# Patient Record
Sex: FEMALE | Race: BLACK OR AFRICAN AMERICAN
[De-identification: names, ages, dates, MRNs, and addresses within clinical notes are randomized per-mention and may not be internally consistent; named-entity substitution may affect disease eponyms.]

---

## 2018-11-27 ENCOUNTER — HOSPITAL ENCOUNTER (EMERGENCY)
Dept: HOSPITAL 74 - JER | Age: 23
LOS: 1 days | Discharge: HOME | End: 2018-11-28
Payer: COMMERCIAL

## 2018-11-27 VITALS — BODY MASS INDEX: 21.2 KG/M2

## 2018-11-27 VITALS — TEMPERATURE: 97.7 F

## 2018-11-27 DIAGNOSIS — F41.9: ICD-10-CM

## 2018-11-27 DIAGNOSIS — R51: ICD-10-CM

## 2018-11-27 DIAGNOSIS — R19.7: ICD-10-CM

## 2018-11-27 DIAGNOSIS — R11.2: ICD-10-CM

## 2018-11-27 DIAGNOSIS — R55: Primary | ICD-10-CM

## 2018-11-27 LAB
ALBUMIN SERPL-MCNC: 4.3 G/DL (ref 3.4–5)
ALP SERPL-CCNC: 92 U/L (ref 45–117)
ALT SERPL-CCNC: 21 U/L (ref 13–61)
ANION GAP SERPL CALC-SCNC: 7 MMOL/L (ref 8–16)
APPEARANCE UR: (no result)
AST SERPL-CCNC: 20 U/L (ref 15–37)
BASOPHILS # BLD: 0.5 % (ref 0–2)
BILIRUB SERPL-MCNC: 0.4 MG/DL (ref 0.2–1)
BILIRUB UR STRIP.AUTO-MCNC: NEGATIVE MG/DL
BUN SERPL-MCNC: 11 MG/DL (ref 7–18)
CALCIUM SERPL-MCNC: 9.1 MG/DL (ref 8.5–10.1)
CHLORIDE SERPL-SCNC: 104 MMOL/L (ref 98–107)
CO2 SERPL-SCNC: 28 MMOL/L (ref 21–32)
COLOR UR: YELLOW
CREAT SERPL-MCNC: 0.8 MG/DL (ref 0.55–1.3)
DEPRECATED RDW RBC AUTO: 12.8 % (ref 11.6–15.6)
EOSINOPHIL # BLD: 0.8 % (ref 0–4.5)
EPITH CASTS URNS QL MICRO: (no result) /HPF
GLUCOSE SERPL-MCNC: 93 MG/DL (ref 74–106)
HCT VFR BLD CALC: 37.9 % (ref 32.4–45.2)
HGB BLD-MCNC: 13.1 GM/DL (ref 10.7–15.3)
KETONES UR QL STRIP: (no result)
LEUKOCYTE ESTERASE UR QL STRIP.AUTO: NEGATIVE
LYMPHOCYTES # BLD: 24.5 % (ref 8–40)
MCH RBC QN AUTO: 29.2 PG (ref 25.7–33.7)
MCHC RBC AUTO-ENTMCNC: 34.6 G/DL (ref 32–36)
MCV RBC: 84.3 FL (ref 80–96)
MONOCYTES # BLD AUTO: 6.2 % (ref 3.8–10.2)
NEUTROPHILS # BLD: 68 % (ref 42.8–82.8)
NITRITE UR QL STRIP: NEGATIVE
PH UR: 8 [PH] (ref 5–8)
PLATELET # BLD AUTO: 290 K/MM3 (ref 134–434)
PMV BLD: 8.1 FL (ref 7.5–11.1)
POTASSIUM SERPLBLD-SCNC: 4.1 MMOL/L (ref 3.5–5.1)
PROT SERPL-MCNC: 8.8 G/DL (ref 6.4–8.2)
PROT UR QL STRIP: (no result)
PROT UR QL STRIP: NEGATIVE
RBC # BLD AUTO: 4.49 M/MM3 (ref 3.6–5.2)
SODIUM SERPL-SCNC: 138 MMOL/L (ref 136–145)
SP GR UR: 1.02 (ref 1.01–1.03)
UROBILINOGEN UR STRIP-MCNC: NEGATIVE MG/DL (ref 0.2–1)
WBC # BLD AUTO: 5.2 K/MM3 (ref 4–10)
YEAST #/AREA URNS HPF: (no result) /[HPF]

## 2018-11-27 PROCEDURE — 3E033GC INTRODUCTION OF OTHER THERAPEUTIC SUBSTANCE INTO PERIPHERAL VEIN, PERCUTANEOUS APPROACH: ICD-10-PCS

## 2018-11-27 NOTE — PDOC
Rapid Medical Evaluation


Chief Complaint: Syncope/Near Syncope


Time Seen by Provider: 11/27/18 21:10


Medical Evaluation: 


 Allergies











Allergy/AdvReac Type Severity Reaction Status Date / Time


 


Penicillins Allergy   Verified 01/05/16 09:29











11/27/18 21:12


Patient c/o: hx anxiety, started to feel anxious while visiting mother at [facility], then syncoped while in mother's room, vomited x 3, pt crying in triage


Patient on brief exam: vss


Patient ordered for: labs, urine, ekg


The patient will proceed to the ED





**Discharge Disposition





- Diagnosis


 Syncope








- Referrals


Referrals: 


Bárbara Lynch [Primary Care Provider] - 





- Patient Instructions





- Post Discharge Activity

## 2018-11-27 NOTE — PDOC
History of Present Illness





- General


History Source: Patient


Exam Limitations: No Limitations





<José Miguel Haynes - Last Filed: 11/27/18 23:00>





<Aggie Oliveira - Last Filed: 11/28/18 00:14>





- General


Chief Complaint: Syncope/Near Syncope


Stated Complaint: Syncope/Near Syncope


Time Seen by Provider: 11/27/18 21:10





- History of Present Illness


Initial Comments: 





11/27/18 23:00


The patient is a 23 year old female, with a significant past medical history of 

anxiety, who presents to the emergency department s/p syncope with, abdominal 

pain, nausea, vomiting (x4), and diarrhea (loose stool). As per patient, when 

she woke up this morning feeling weak experiencing diffuse abdominal pain, 

nausea, vomiting, and diarrhea. Patient notes after leaving work she began to 

feel dizzy while driving to visit her mother in the hospital. Patient endorses 

that when visiting her mother she began to feel dizzy again, syncopized, 

prompting her visit to the ER. She notes an associated headache since her fall. 

Patient is currently on her menses and notes similar episodes in the past due 

to dehydration. Patient endorses increased stress and anxiety attacks over the 

past few days.





She is unaware if she hit her head. She denies recent fevers or chills. She 

denies recent  dysuria, frequency, urgency or hematuria. She denies recent 

chest pain or shortness of breath.





Allergies: Penicillins. 


Past surgical history: None reported.


Social history: Nonsmoker. Denies EtOH use and recreational drug use. 


Primary Care Physician: Dr. Lynch


 (José Miguel Haynes)





Past History





<José Miguel Haynes - Last Filed: 11/27/18 23:00>





- Past Medical History


Asthma: Yes


COPD: No





- Immunization History


Immunization Up to Date: Yes





- Suicide/Smoking/Psychosocial Hx


Smoking History: Never smoked


Have you smoked in the past 12 months: No


Number of Cigarettes Smoked Daily: 1


Information on smoking cessation initiated: No


Hx Alcohol Use: No


Drug/Substance Use Hx: No


Substance Use Type: Marijuana





<Aggie Oliveira - Last Filed: 11/28/18 00:14>





- Past Medical History


Allergies/Adverse Reactions: 


 Allergies











Allergy/AdvReac Type Severity Reaction Status Date / Time


 


Penicillins Allergy   Verified 11/27/18 21:15











Home Medications: 


Ambulatory Orders





traZODone HCL [Trazodone HCl] 50 mg PO DAILY 11/27/18 











**Review of Systems





- Review of Systems


Able to Perform ROS?: Yes


All Other Systems: Reviewed and Negative





<José Miguel Haynes - Last Filed: 11/27/18 23:00>





<Aggie Oliveira - Last Filed: 11/28/18 00:14>





- Review of Systems


Comments:: 





11/27/18 23:01


CONSTITUTIONAL:


Absent: fever, no chills, no fatigue


EYES:


Absent: visual changes


ENT:


Absent: ear pain, no sore throat


CARDIOVASCULAR:


Absent: chest pain, no palpitations


RESPIRATORY:


Absent: cough, no SOB


GI:


Present: Nausea. Vomiting. Diarrhea. Abdominal pain. 


GENITOURINARY:


Absent: dysuria, no frequency, no hematuria


MUSKULOSKELETAL:


Absent: back pain, no arthralgia, no myalgia


SKIN:


Absent: rash


NEURO:


Present: Headache. Syncope. Dizziness.


 (José Miguel Haynes)





*Physical Exam





<José Miguel Haynes - Last Filed: 11/27/18 23:00>





<Aggie Oliveira - Last Filed: 11/28/18 00:14>





- Vital Signs


 Last Vital Signs











Temp Pulse Resp BP Pulse Ox


 


 97.7 F   105 H  16   138/85   98 


 


 11/27/18 21:12  11/27/18 21:12  11/27/18 21:12  11/27/18 21:12  11/27/18 21:12














- Physical Exam


Comments: 





11/27/18 23:01


GENERAL:


Well developed, well nourished. Awake and alert. No acute distress.


HEENT:


Normocephalic, atraumatic. PERRLA, EOMI. No conjunctival pallor. Sclera are non-

icteric. Moist mucous membranes. Oropharynx is clear.


NECK: 


Supple. Full ROM. No JVD. Carotid pulses 2+ and symmetric, without bruits. No 

thyromegaly. No lymphadenopathy.


CARDIOVASCULAR:


Regular rate and rhythm. No murmurs, rubs, or gallops. Distal pulses are 2+ and 

symmetric. 


PULMONARY: 


No evidence of respiratory distress. Lungs clear to auscultation bilaterally. 

No wheezing, rales or rhonchi.


ABDOMINAL:


Soft. Non-tender. Non-distended. No rebound or guarding. No organomegaly. 

Normoactive bowel sounds. 


MUSCULOSKELETAL 


Normal range of motion at all joints. No bony deformities or tenderness. No CVA 

tenderness.


EXTREMITIES: 


No cyanosis. No clubbing. No edema. No calf tenderness.


SKIN: 


Warm and dry. Normal capillary refill. No rashes. No jaundice. 


NEUROLOGICAL: 


Alert, awake, appropriate. Cranial nerves 2-12 intact. Normal speech. No focal 

neurological deficits. 


PSYCHIATRIC: 


Cooperative. Good eye contact. Appropriate mood and affect.


 (José Miguel Haynes)





- Procedure Monitoring


Vital Signs: 


Procedure Monitoring Vital Signs











Temperature  97.7 F   11/27/18 21:12


 


Pulse Rate  105 H  11/27/18 21:12


 


Respiratory Rate  16   11/27/18 21:12


 


Blood Pressure  138/85   11/27/18 21:12


 


O2 Sat by Pulse Oximetry (%)  98   11/27/18 21:12











ED Treatment Course





- LABORATORY


CBC & Chemistry Diagram: 


 11/27/18 22:20





 11/27/18 22:20





<José Miguel Haynes - Last Filed: 11/27/18 23:00>





- LABORATORY


CBC & Chemistry Diagram: 


 11/27/18 22:20





 11/27/18 22:20





<Aggie Oliveira - Last Filed: 11/28/18 00:14>





- ADDITIONAL ORDERS


Additional order review: 


 Laboratory  Results











  11/27/18 11/27/18 11/27/18





  22:50 22:50 22:20


 


Sodium    138


 


Potassium    4.1


 


Chloride    104


 


Carbon Dioxide    28


 


Anion Gap    7 L


 


BUN    11


 


Creatinine    0.8


 


Creat Clearance w eGFR    > 60


 


Random Glucose    93


 


Calcium    9.1


 


Total Bilirubin    0.4


 


AST    20


 


ALT    21


 


Alkaline Phosphatase    92


 


Creatine Kinase    153


 


Creatine Kinase Index    0.6


 


CK-MB (CK-2)    < 1.0


 


Troponin I    < 0.02


 


Total Protein    8.8 H


 


Albumin    4.3


 


Urine Color   Yellow 


 


Urine Appearance   Cloudy 


 


Urine pH   8.0  D 


 


Ur Specific Gravity   1.023 


 


Urine Protein   2+ H 


 


Urine Glucose (UA)   Negative 


 


Urine Ketones   1+ H 


 


Urine Blood   2+ H 


 


Urine Nitrite   Negative 


 


Urine Bilirubin   Negative 


 


Urine Urobilinogen   Negative 


 


Ur Leukocyte Esterase   Negative 


 


Urine WBC (Auto)   1 


 


Urine RBC (Auto)   4 


 


Ur Epithelial Cells   Rare 


 


Urine Yeast   Rare 


 


Urine HCG, Qual  Negative  








 











  11/27/18





  22:20


 


RBC  4.49


 


MCV  84.3


 


MCHC  34.6


 


RDW  12.8


 


MPV  8.1


 


Neutrophils %  68.0  D


 


Lymphocytes %  24.5  D


 


Monocytes %  6.2


 


Eosinophils %  0.8


 


Basophils %  0.5  D














- Medications


Given in the ED: 


ED Medications














Discontinued Medications














Generic Name Dose Route Start Last Admin





  Trade Name Sonia  PRN Reason Stop Dose Admin


 


Acetaminophen  975 mg  11/27/18 23:45  11/28/18 00:05





  Tylenol -  PO  11/27/18 23:46  975 mg





  ONCE STA   Administration





     





     





     





     


 


Sodium Chloride  1,000 mls @ 1,000 mls/hr  11/27/18 21:59  11/27/18 22:50





  Normal Saline -  IV  11/27/18 22:58  1,000 mls/hr





  ASDIR STA   Administration





     





     





     





     


 


Ondansetron HCl  4 mg  11/27/18 21:59  11/27/18 22:50





  Zofran Injection  IVPUSH  11/27/18 22:00  4 mg





  ONCE ONE   Administration





     





     





     





     














*DC/Admit/Observation/Transfer





<José Miguel Haynes - Last Filed: 11/27/18 23:00>





<Aggie Oliveira - Last Filed: 11/28/18 00:14>


Diagnosis at time of Disposition: 


 Nausea vomiting and diarrhea, Vasovagal episode








- Discharge Dispostion


Disposition: HOME


Condition at time of disposition: Stable





- Referrals


Referrals: 


Bárbara Lynch [Primary Care Provider] - 





- Patient Instructions


Printed Discharge Instructions:  DI for Nausea -- Adult, DI for Vomiting -- 

Adult, DI for Anxiety -- Adult, DI for Syncope in Adults (Fainting)


Additional Instructions: 


please advance your diet as tolerated


Start  with liquids and then try solid foods such as bananas,rice ,toast,

applesauce


return for worsening symptoms





- Post Discharge Activity





- Attestations


Scribe Attestion: 





11/27/18 23:01





Documentation prepared by José Miguel Haynes, acting as medical scribe for 

Aggie Oliveira MD. (José Miguel Haynes)

## 2018-11-28 VITALS — SYSTOLIC BLOOD PRESSURE: 132 MMHG | DIASTOLIC BLOOD PRESSURE: 78 MMHG | HEART RATE: 99 BPM

## 2018-11-28 NOTE — EKG
Test Reason : 

Blood Pressure : ***/*** mmHG

Vent. Rate : 085 BPM     Atrial Rate : 085 BPM

   P-R Int : 154 ms          QRS Dur : 092 ms

    QT Int : 356 ms       P-R-T Axes : 050 073 051 degrees

   QTc Int : 423 ms

 

NORMAL SINUS RHYTHM WITH SINUS ARRHYTHMIA

NORMAL ECG

NO PREVIOUS ECGS AVAILABLE

Confirmed by JOVANNA SINGH, MARTY ([id number]) on 11/28/2018 11:45:01 AM

 

Referred By:  IDA           Confirmed By:MARTY NUGENT MD

## 2018-12-08 ENCOUNTER — HOSPITAL ENCOUNTER (EMERGENCY)
Dept: HOSPITAL 74 - JERFT | Age: 23
Discharge: HOME | End: 2018-12-08
Payer: COMMERCIAL

## 2018-12-08 VITALS — TEMPERATURE: 98.9 F

## 2018-12-08 VITALS — DIASTOLIC BLOOD PRESSURE: 86 MMHG | HEART RATE: 101 BPM | SYSTOLIC BLOOD PRESSURE: 131 MMHG

## 2018-12-08 VITALS — BODY MASS INDEX: 24.3 KG/M2
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
## 2018-12-08 DIAGNOSIS — S09.93XA: Primary | ICD-10-CM
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
## 2018-12-09 NOTE — PDOC
Patient Follow-up (Call Back)





- Post ED Follow - Up


Condition at time of discharge: Stable


Disposition at time of original discharge: HOME


Reason for Call Back: Complaint/Condition F/U





- Disposition


Additional Instructions/Notes: 





Pt called back, as she was having pain and jaw stiffness. She was given valium 

in ED, which did help. I will prescribe high dose motrin and robaxin for her to 

take PRN.

## 2021-12-04 ENCOUNTER — HOSPITAL ENCOUNTER (EMERGENCY)
Dept: HOSPITAL 74 - JER | Age: 26
Discharge: HOME | End: 2021-12-04
Payer: COMMERCIAL

## 2021-12-04 VITALS — SYSTOLIC BLOOD PRESSURE: 110 MMHG | TEMPERATURE: 98.6 F | HEART RATE: 97 BPM | DIASTOLIC BLOOD PRESSURE: 68 MMHG

## 2021-12-04 VITALS — BODY MASS INDEX: 25 KG/M2

## 2021-12-04 DIAGNOSIS — R07.9: ICD-10-CM

## 2021-12-04 DIAGNOSIS — R55: ICD-10-CM

## 2021-12-04 DIAGNOSIS — S09.90XA: Primary | ICD-10-CM
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
## 2021-12-04 LAB
ALBUMIN SERPL-MCNC: 3.7 G/DL (ref 3.4–5)
ALP SERPL-CCNC: 94 U/L (ref 45–117)
ALT SERPL-CCNC: 17 U/L (ref 13–61)
AMPHET UR-MCNC: NEGATIVE NG/ML
ANION GAP SERPL CALC-SCNC: 6 MMOL/L (ref 8–16)
APPEARANCE UR: CLEAR
APTT BLD: 29.4 SECONDS (ref 25.2–36.5)
AST SERPL-CCNC: 17 U/L (ref 15–37)
BARBITURATES UR-MCNC: NEGATIVE UG/ML
BASOPHILS # BLD: 0.2 % (ref 0–2)
BENZODIAZ UR SCN-MCNC: NEGATIVE NG/ML
BILIRUB SERPL-MCNC: 0.4 MG/DL (ref 0.2–1)
BILIRUB UR STRIP.AUTO-MCNC: NEGATIVE MG/DL
BUN SERPL-MCNC: 4 MG/DL (ref 7–18)
CALCIUM SERPL-MCNC: 9 MG/DL (ref 8.5–10.1)
CHLORIDE SERPL-SCNC: 104 MMOL/L (ref 98–107)
CO2 SERPL-SCNC: 26 MMOL/L (ref 21–32)
COCAINE UR-MCNC: NEGATIVE NG/ML
COLOR UR: YELLOW
CREAT SERPL-MCNC: 0.8 MG/DL (ref 0.55–1.3)
DEPRECATED RDW RBC AUTO: 15 % (ref 11.6–15.6)
EOSINOPHIL # BLD: 0.1 % (ref 0–4.5)
GLUCOSE SERPL-MCNC: 104 MG/DL (ref 74–106)
HCT VFR BLD CALC: 37.3 % (ref 32.4–45.2)
HGB BLD-MCNC: 12.2 GM/DL (ref 10.7–15.3)
INR BLD: 1.17 (ref 0.83–1.09)
KETONES UR QL STRIP: (no result)
LEUKOCYTE ESTERASE UR QL STRIP.AUTO: NEGATIVE
LYMPHOCYTES # BLD: 2.5 % (ref 8–40)
MCH RBC QN AUTO: 26.4 PG (ref 25.7–33.7)
MCHC RBC AUTO-ENTMCNC: 32.9 G/DL (ref 32–36)
MCV RBC: 80.3 FL (ref 80–96)
METHADONE UR-MCNC: NEGATIVE UG/L
MONOCYTES # BLD AUTO: 13.4 % (ref 3.8–10.2)
NEUTROPHILS # BLD: 83.8 % (ref 42.8–82.8)
NITRITE UR QL STRIP: NEGATIVE
OPIATES UR QL SCN: NEGATIVE
PCP UR QL SCN: NEGATIVE
PH UR: 6 [PH] (ref 5–8)
PLATELET # BLD AUTO: 247 10^3/UL (ref 134–434)
PMV BLD: 8.4 FL (ref 7.5–11.1)
PROT SERPL-MCNC: 8.2 G/DL (ref 6.4–8.2)
PROT UR QL STRIP: NEGATIVE
PROT UR QL STRIP: NEGATIVE
PT PNL PPP: 13.7 SEC (ref 9.7–13)
RBC # BLD AUTO: 4.64 M/MM3 (ref 3.6–5.2)
SODIUM SERPL-SCNC: 136 MMOL/L (ref 136–145)
SP GR UR: 1.02 (ref 1.01–1.03)
UROBILINOGEN UR STRIP-MCNC: 1 MG/DL (ref 0.2–1)
WBC # BLD AUTO: 5.8 K/MM3 (ref 4–10)

## 2021-12-04 PROCEDURE — 3E0333Z INTRODUCTION OF ANTI-INFLAMMATORY INTO PERIPHERAL VEIN, PERCUTANEOUS APPROACH: ICD-10-PCS

## 2021-12-04 PROCEDURE — U0003 INFECTIOUS AGENT DETECTION BY NUCLEIC ACID (DNA OR RNA); SEVERE ACUTE RESPIRATORY SYNDROME CORONAVIRUS 2 (SARS-COV-2) (CORONAVIRUS DISEASE [COVID-19]), AMPLIFIED PROBE TECHNIQUE, MAKING USE OF HIGH THROUGHPUT TECHNOLOGIES AS DESCRIBED BY CMS-2020-01-R: HCPCS

## 2021-12-04 PROCEDURE — C9803 HOPD COVID-19 SPEC COLLECT: HCPCS

## 2021-12-04 PROCEDURE — U0005 INFEC AGEN DETEC AMPLI PROBE: HCPCS

## 2021-12-07 ENCOUNTER — HOSPITAL ENCOUNTER (EMERGENCY)
Dept: HOSPITAL 74 - JERFT | Age: 26
End: 2021-12-07
Payer: COMMERCIAL

## 2021-12-07 VITALS — SYSTOLIC BLOOD PRESSURE: 107 MMHG | TEMPERATURE: 97.8 F | DIASTOLIC BLOOD PRESSURE: 76 MMHG | HEART RATE: 89 BPM

## 2021-12-07 VITALS — BODY MASS INDEX: 24.2 KG/M2

## 2021-12-07 DIAGNOSIS — J20.9: Primary | ICD-10-CM
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
